# Patient Record
Sex: FEMALE | Race: WHITE | NOT HISPANIC OR LATINO | Employment: STUDENT | URBAN - METROPOLITAN AREA
[De-identification: names, ages, dates, MRNs, and addresses within clinical notes are randomized per-mention and may not be internally consistent; named-entity substitution may affect disease eponyms.]

---

## 2023-11-04 ENCOUNTER — HOSPITAL ENCOUNTER (EMERGENCY)
Facility: CLINIC | Age: 23
Discharge: HOME | End: 2023-11-04
Attending: EMERGENCY MEDICINE | Admitting: EMERGENCY MEDICINE
Payer: COMMERCIAL

## 2023-11-04 VITALS
HEIGHT: 68 IN | SYSTOLIC BLOOD PRESSURE: 142 MMHG | BODY MASS INDEX: 22.52 KG/M2 | WEIGHT: 148.6 LBS | HEART RATE: 68 BPM | DIASTOLIC BLOOD PRESSURE: 76 MMHG | TEMPERATURE: 98.4 F | RESPIRATION RATE: 16 BRPM | OXYGEN SATURATION: 99 %

## 2023-11-04 DIAGNOSIS — H66.92 ACUTE LEFT OTITIS MEDIA: ICD-10-CM

## 2023-11-04 DIAGNOSIS — J06.9 UPPER RESPIRATORY TRACT INFECTION, UNSPECIFIED TYPE: ICD-10-CM

## 2023-11-04 PROCEDURE — 99284 EMERGENCY DEPT VISIT MOD MDM: CPT | Performed by: EMERGENCY MEDICINE

## 2023-11-04 RX ORDER — BENZONATATE 100 MG/1
100 CAPSULE ORAL 3 TIMES DAILY PRN
Qty: 20 CAPSULE | Refills: 0 | Status: SHIPPED | OUTPATIENT
Start: 2023-11-04

## 2023-11-04 RX ORDER — CEFDINIR 300 MG/1
300 CAPSULE ORAL 2 TIMES DAILY
Qty: 14 CAPSULE | Refills: 0 | Status: SHIPPED | OUTPATIENT
Start: 2023-11-04

## 2023-11-04 NOTE — ED TRIAGE NOTES
Pt ambulatory from home with L ear pain, cough, sore throat. Symptoms started Monday. Pt thinks she may have an ear infection.     Triage Assessment (Adult)       Row Name 11/04/23 2035          Triage Assessment    Airway WDL WDL        Respiratory WDL    Respiratory WDL WDL        Skin Circulation/Temperature WDL    Skin Circulation/Temperature WDL WDL        Cardiac WDL    Cardiac WDL WDL        Peripheral/Neurovascular WDL    Peripheral Neurovascular WDL WDL        Cognitive/Neuro/Behavioral WDL    Cognitive/Neuro/Behavioral WDL WDL

## 2023-11-04 NOTE — DISCHARGE INSTRUCTIONS
You have an ear infection.    Take the antibiotics as prescribed.     Take the cough medication as prescribed.     Take tylenol/ibuprofen as needed for fever.

## 2023-11-04 NOTE — ED PROVIDER NOTES
"ED Provider Note  [facility]      History     Chief Complaint   Patient presents with    Otalgia    Cough    Pharyngitis     HPI  Severino Medrano is a 23 year old female with a history of sinus issues who presents to the ED with c/o L ear pain, nonproductive cough, and sore throat. Also reports drippy nose. Onset of symptoms was Monday (5 days ago). Presents to the ED now d/t symptoms worsening, increased ear pressure and cough. No known fevers as of recent. Reports taking sudafed with tylenol. No hx of smoking. No hx of ear infections. Updated on immunizations.     Past Medical History  No past medical history on file.  Past Surgical History:   Procedure Laterality Date    NO HISTORY OF SURGERY       No current outpatient medications on file.    Allergies   Allergen Reactions    Amoxicillin Hives     target lesions     Family History  Family History   Problem Relation Age of Onset    Allergies Mother         Seasonal Hayfever    Allergies Maternal Uncle         Seasonal     Heart Disease Paternal Grandfather         bypass surgery    Hypertension Maternal Grandmother     Hypertension Paternal Grandfather     Lipids Paternal Grandfather      Social History   Social History     Tobacco Use    Smoking status: Never    Tobacco comments:     No exposure to 2nd hand smoke in the house      Past medical history, past surgical history, medications, allergies, family history, and social history were reviewed with the patient. No additional pertinent items.      A complete review of systems was performed with pertinent positives and negatives noted in the HPI, and all other systems negative.    Physical Exam   BP: (!) 142/76  Pulse: 68  Temp: 98.4  F (36.9  C)  Resp: 16  Height: 172.7 cm (5' 8\")  Weight: 67.4 kg (148 lb 9.6 oz)  SpO2: 99 %  Physical Exam  Constitutional:       General: She is not in acute distress.     Appearance: Normal appearance. She is not diaphoretic.   HENT:      Head: " Atraumatic.      Right Ear: Tympanic membrane normal. No tenderness. No middle ear effusion. Tympanic membrane is not erythematous.      Left Ear: Tenderness present. A middle ear effusion is present. Tympanic membrane is erythematous.      Mouth/Throat:      Mouth: Mucous membranes are moist.   Eyes:      General: No scleral icterus.     Conjunctiva/sclera: Conjunctivae normal.   Pulmonary:      Effort: No respiratory distress.      Breath sounds: Normal breath sounds.   Abdominal:      General: Abdomen is flat.   Musculoskeletal:      Cervical back: Neck supple.   Skin:     General: Skin is warm.      Findings: No rash.   Neurological:      Mental Status: She is alert.           ED Course, Procedures, & Data      Procedures                      No results found for any visits on 11/04/23.  Medications - No data to display  Labs Ordered and Resulted from Time of ED Arrival to Time of ED Departure - No data to display  No orders to display          Critical care was not performed.     Medical Decision Making  The patient's presentation was of low complexity (an acute and uncomplicated illness or injury).    The patient's evaluation involved:  history and exam without other MDM data elements    The patient's management necessitated moderate risk (prescription drug management including medications given in the ED).    Assessment & Plan    Denies young healthy female presents the ER due to URI symptoms and left ear pain.  Patient found to have an otitis media.  Patient has an amoxicillin allergy with a rash.  Case discussed with pharmacy.  Plan to discharge home with cefdinir for treatment of a otitis media.  Patient stable for discharge.    I have reviewed the nursing notes. I have reviewed the findings, diagnosis, plan and need for follow up with the patient.    New Prescriptions    No medications on file       Final diagnoses:   Acute left otitis media   Upper respiratory tract infection, unspecified type        Belgica REYNOLDS [facility] EMERGENCY DEPARTMENT  11/4/2023     Belgica Carranza MD  11/04/23 [id number]